# Patient Record
Sex: FEMALE | Race: WHITE | Employment: STUDENT | URBAN - METROPOLITAN AREA
[De-identification: names, ages, dates, MRNs, and addresses within clinical notes are randomized per-mention and may not be internally consistent; named-entity substitution may affect disease eponyms.]

---

## 2018-03-27 ENCOUNTER — APPOINTMENT (OUTPATIENT)
Dept: GENERAL RADIOLOGY | Facility: HOSPITAL | Age: 18
End: 2018-03-27
Payer: COMMERCIAL

## 2018-03-27 ENCOUNTER — HOSPITAL ENCOUNTER (EMERGENCY)
Facility: HOSPITAL | Age: 18
Discharge: HOME | End: 2018-03-28
Attending: PEDIATRICS
Payer: COMMERCIAL

## 2018-03-27 VITALS
DIASTOLIC BLOOD PRESSURE: 74 MMHG | BODY MASS INDEX: 26 KG/M2 | RESPIRATION RATE: 18 BRPM | WEIGHT: 158.75 LBS | SYSTOLIC BLOOD PRESSURE: 133 MMHG | OXYGEN SATURATION: 100 % | HEART RATE: 83 BPM | TEMPERATURE: 98 F

## 2018-03-27 DIAGNOSIS — S93.401A MODERATE RIGHT ANKLE SPRAIN, INITIAL ENCOUNTER: Primary | ICD-10-CM

## 2018-03-27 PROCEDURE — 73610 X-RAY EXAM OF ANKLE: CPT | Performed by: PEDIATRICS

## 2018-03-27 PROCEDURE — 99283 EMERGENCY DEPT VISIT LOW MDM: CPT

## 2018-03-28 NOTE — ED PROVIDER NOTES
Patient Seen in: [facility] Emergency Department    History   Patient presents with:  Lower Extremity Injury (musculoskeletal)    Stated Complaint: right ankle inj    HPI    47-year-old female here with right ankle injury.   She was playing [employer] alert and oriented to person, place, and time. No cranial nerve deficit. She exhibits normal muscle tone. Coordination normal.   Skin: Skin is warm. No rash noted. She is not diaphoretic. No pallor. Psychiatric: She has a normal mood and affect.  Her beha that occurred in the emergency department. Instructed to return to emergency department or contact PCP for persistent, recurrent, or worsening symptoms.       Disposition and Plan     Clinical Impression:  Moderate right ankle sprain, initial encounter  (pr

## 2018-12-04 ENCOUNTER — TELEPHONE (OUTPATIENT)
Dept: INTERNAL MEDICINE CLINIC | Facility: CLINIC | Age: 18
End: 2018-12-04

## 2018-12-07 NOTE — TELEPHONE ENCOUNTER
Has had 2 shots of [address] and 2003 as per records.   So she has had her complete vaccination boosters

## 2019-07-01 ENCOUNTER — OFFICE VISIT (OUTPATIENT)
Dept: OBGYN CLINIC | Facility: CLINIC | Age: 19
End: 2019-07-01
Payer: COMMERCIAL

## 2019-07-01 VITALS
SYSTOLIC BLOOD PRESSURE: 135 MMHG | HEIGHT: 66.7 IN | WEIGHT: 152 LBS | HEART RATE: 98 BPM | DIASTOLIC BLOOD PRESSURE: 82 MMHG | BODY MASS INDEX: 24.14 KG/M2

## 2019-07-01 DIAGNOSIS — Z01.419 ENCOUNTER FOR GYNECOLOGICAL EXAMINATION WITHOUT ABNORMAL FINDING: Primary | ICD-10-CM

## 2019-07-01 PROCEDURE — 99385 PREV VISIT NEW AGE 18-39: CPT | Performed by: OBSTETRICS & GYNECOLOGY

## 2019-07-01 PROCEDURE — 99212 OFFICE O/P EST SF 10 MIN: CPT | Performed by: OBSTETRICS & GYNECOLOGY

## 2019-07-01 RX ORDER — NORETHINDRONE ACETATE AND ETHINYL ESTRADIOL 1.5-30(21)
1 KIT ORAL DAILY
Qty: 3 PACKAGE | Refills: 0 | Status: SHIPPED | OUTPATIENT
Start: 2019-07-01 | End: 2019-09-23

## 2019-07-01 NOTE — PROGRESS NOTES
Na Jacinto is a 22year old female [facility] Patient's last menstrual period was 06/12/2019.   Patient presents with:  Gyn Exam: Larry De Leon / IRREGULAR MENSES    Menses are irreg- menarche was at age 10 and then she did not get another one for almost Relationship status: Not on file      Intimate partner violence:        Fear of current or ex partner: Not on file        Emotionally abused: Not on file        Physically abused: Not on file        Forced sexual activity: Not on file    Other Topics history of anemia, easy bruising or bleeding.       PHYSICAL EXAM:   Constitutional: well developed, well nourished  Head/Face: normocephalic  Neck/Thyroid: thyroid symmetric, no thyromegaly, no nodules, no adenopathy  Lymphatic:no abnormal supraclavicular

## 2019-09-16 ENCOUNTER — TELEPHONE (OUTPATIENT)
Dept: OBGYN CLINIC | Facility: CLINIC | Age: 19
End: 2019-09-16

## 2019-09-16 NOTE — TELEPHONE ENCOUNTER
ADVISED PT SHE WAS SUPPOSED TO RETURN FOR BP CHECK BEFORE GOING BACK TO SCHOOL BUT SHE WAS UNABLE TO DO SO DUE TO TRAVELING. CAN HER  CHECK HER BP AND FAX US THE INFO? WILL YOU ACCEPT THAT IN ORDER TO GET ADDITIONAL REFILLS?

## 2019-09-16 NOTE — TELEPHONE ENCOUNTER
Pt asking if refill of birth control can be sent to pharm closer to school. Updated pharm in chart.  pls adv

## 2019-09-17 NOTE — TELEPHONE ENCOUNTER
Pt. Informed that it is ok for her  to send in her BP reading. Pt. Instructed to have it faxed to [phone] and to call to confirm we received it. If BP is normal , Cap will refill her medication. Pt. Verbalized understanding.

## 2019-09-18 NOTE — TELEPHONE ENCOUNTER
BP, 124/80, received from [facility], [facility] [employer], received and placed on [employer]. OK for refills and how long. See notes from pt's visit on 7-1-19.

## 2019-09-23 ENCOUNTER — TELEPHONE (OUTPATIENT)
Dept: OBGYN CLINIC | Facility: CLINIC | Age: 19
End: 2019-09-23

## 2019-09-23 RX ORDER — NORETHINDRONE ACETATE AND ETHINYL ESTRADIOL 1.5-30(21)
1 KIT ORAL DAILY
Qty: 1 PACKAGE | Refills: 10 | Status: SHIPPED | OUTPATIENT
Start: 2019-09-23 | End: 2020-05-13

## 2020-02-26 ENCOUNTER — TELEPHONE (OUTPATIENT)
Dept: OBGYN CLINIC | Facility: CLINIC | Age: 20
End: 2020-02-26

## 2020-02-26 NOTE — TELEPHONE ENCOUNTER
I CONFIRMED PT WANTS TO  RX AT [facility] IN [place] AND THAT I WILL CALL THEM TO CONFIRM SHE SHOULD STILL HAVE REFILLS AVAILABLE BECAUSE RX WAS SENT ON 9-23-19 FOR 1 PACK WITH 10 REFILLS. I CALLED THE PHARMACY AND IT SHOWS PT HAS BEEN GETTING RX AT A DIFFERENT [facility].   I ASKED HIM TO TRANSFER RX AND GET IT READY AS SHE WILL BE PICKING IT UP TODAY.

## 2020-05-13 RX ORDER — NORETHINDRONE ACETATE AND ETHINYL ESTRADIOL AND FERROUS FUMARATE 1.5-30(21)
KIT ORAL
Qty: 1 PACKAGE | Refills: 1 | Status: SHIPPED | OUTPATIENT
Start: 2020-05-13 | End: 2020-05-18

## 2020-05-18 ENCOUNTER — TELEPHONE (OUTPATIENT)
Dept: OBGYN CLINIC | Facility: CLINIC | Age: 20
End: 2020-05-18

## 2020-05-18 RX ORDER — NORETHINDRONE ACETATE AND ETHINYL ESTRADIOL 1.5-30(21)
1 KIT ORAL DAILY
Qty: 3 PACKAGE | Refills: 1 | Status: SHIPPED | OUTPATIENT
Start: 2020-05-18 | End: 2020-08-17 | Stop reason: ALTCHOICE

## 2020-05-18 NOTE — TELEPHONE ENCOUNTER
Received OCP rx refill request from [employer] rx mail service. Called pt to ask if she is authoring pharmacy change and pt state she is. States she did not  rx from 5/13 because her pharmacy suggested the mail service.  Informed pt nurse can submit refil

## 2020-06-05 RX ORDER — NORETHINDRONE ACETATE AND ETHINYL ESTRADIOL AND FERROUS FUMARATE 1.5-30(21)
KIT ORAL
Qty: 28 TABLET | Refills: 1 | OUTPATIENT
Start: 2020-06-05

## 2020-08-17 ENCOUNTER — OFFICE VISIT (OUTPATIENT)
Dept: INTERNAL MEDICINE CLINIC | Facility: CLINIC | Age: 20
End: 2020-08-17
Payer: COMMERCIAL

## 2020-08-17 ENCOUNTER — TELEPHONE (OUTPATIENT)
Dept: INTERNAL MEDICINE CLINIC | Facility: CLINIC | Age: 20
End: 2020-08-17

## 2020-08-17 ENCOUNTER — OFFICE VISIT (OUTPATIENT)
Dept: OBGYN CLINIC | Facility: CLINIC | Age: 20
End: 2020-08-17
Payer: COMMERCIAL

## 2020-08-17 VITALS
DIASTOLIC BLOOD PRESSURE: 83 MMHG | BODY MASS INDEX: 23.86 KG/M2 | SYSTOLIC BLOOD PRESSURE: 116 MMHG | HEIGHT: 67 IN | HEART RATE: 81 BPM | WEIGHT: 152 LBS

## 2020-08-17 VITALS
TEMPERATURE: 99 F | DIASTOLIC BLOOD PRESSURE: 79 MMHG | SYSTOLIC BLOOD PRESSURE: 125 MMHG | BODY MASS INDEX: 24.65 KG/M2 | HEIGHT: 66 IN | HEART RATE: 64 BPM | WEIGHT: 153.38 LBS

## 2020-08-17 DIAGNOSIS — N89.8 VAGINAL DISCHARGE: ICD-10-CM

## 2020-08-17 DIAGNOSIS — F90.0 ATTENTION DEFICIT HYPERACTIVITY DISORDER (ADHD), PREDOMINANTLY INATTENTIVE TYPE: ICD-10-CM

## 2020-08-17 DIAGNOSIS — Z30.41 ORAL CONTRACEPTIVE PILL SURVEILLANCE: ICD-10-CM

## 2020-08-17 DIAGNOSIS — Z01.419 ENCOUNTER FOR GYNECOLOGICAL EXAMINATION WITHOUT ABNORMAL FINDING: Primary | ICD-10-CM

## 2020-08-17 DIAGNOSIS — Z00.00 ROUTINE PHYSICAL EXAMINATION: Primary | ICD-10-CM

## 2020-08-17 PROCEDURE — 3008F BODY MASS INDEX DOCD: CPT | Performed by: PHYSICIAN ASSISTANT

## 2020-08-17 PROCEDURE — 99385 PREV VISIT NEW AGE 18-39: CPT | Performed by: ADVANCED PRACTICE MIDWIFE

## 2020-08-17 PROCEDURE — 3078F DIAST BP <80 MM HG: CPT | Performed by: PHYSICIAN ASSISTANT

## 2020-08-17 PROCEDURE — 3008F BODY MASS INDEX DOCD: CPT | Performed by: ADVANCED PRACTICE MIDWIFE

## 2020-08-17 PROCEDURE — 3079F DIAST BP 80-89 MM HG: CPT | Performed by: ADVANCED PRACTICE MIDWIFE

## 2020-08-17 PROCEDURE — 3074F SYST BP LT 130 MM HG: CPT | Performed by: PHYSICIAN ASSISTANT

## 2020-08-17 PROCEDURE — 99395 PREV VISIT EST AGE 18-39: CPT | Performed by: PHYSICIAN ASSISTANT

## 2020-08-17 PROCEDURE — 3074F SYST BP LT 130 MM HG: CPT | Performed by: ADVANCED PRACTICE MIDWIFE

## 2020-08-17 RX ORDER — DEXTROAMPHETAMINE SACCHARATE, AMPHETAMINE ASPARTATE, DEXTROAMPHETAMINE SULFATE AND AMPHETAMINE SULFATE 2.5; 2.5; 2.5; 2.5 MG/1; MG/1; MG/1; MG/1
10 TABLET ORAL DAILY
Qty: 30 TABLET | Refills: 0 | Status: SHIPPED | OUTPATIENT
Start: 2020-08-17 | End: 2020-09-25

## 2020-08-17 RX ORDER — KETOCONAZOLE 20 MG/G
CREAM TOPICAL
Qty: 30 G | Refills: 0 | Status: SHIPPED | OUTPATIENT
Start: 2020-08-17 | End: 2020-09-02

## 2020-08-17 RX ORDER — NORGESTIMATE AND ETHINYL ESTRADIOL 7DAYSX3 LO
1 KIT ORAL DAILY
Qty: 3 PACKAGE | Refills: 3 | Status: SHIPPED | OUTPATIENT
Start: 2020-08-17 | End: 2020-09-14 | Stop reason: WASHOUT

## 2020-08-17 RX ORDER — DEXTROAMPHETAMINE SACCHARATE, AMPHETAMINE ASPARTATE, DEXTROAMPHETAMINE SULFATE AND AMPHETAMINE SULFATE 2.5; 2.5; 2.5; 2.5 MG/1; MG/1; MG/1; MG/1
10 TABLET ORAL DAILY
Qty: 30 TABLET | Refills: 0 | Status: SHIPPED | OUTPATIENT
Start: 2020-08-17 | End: 2020-08-17

## 2020-08-17 NOTE — PROGRESS NOTES
HPI:    Patient ID: Ny Quinteros is a 23year old female. HPI    New patient presents for a physical exam. Doing well at this time.  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 Physical Exam    Constitutional: She is oriented to person, place, and time and thin.  She appears well-develop (ADHD), predominantly inattentive type  -start with 5mg daily and go up to 10mg. Follow up in three weeks.  SE discussed    Orders Placed This Encounter      CBC W Differential W Platelet [E]      Comp Metabolic Panel (14) [E]      Lipid Panel [E]      TSH

## 2020-08-17 NOTE — TELEPHONE ENCOUNTER
Patient states medication amphetamine-dextroamphetamine 10 MG Oral Tab is out of stock at her [facility]. Patient is requesting that medication script be resent to [facility] ([address]

## 2020-08-19 LAB — TRICHOMONAS SCREEN: NEGATIVE

## 2020-08-19 NOTE — PROGRESS NOTES
HPI:   Roxanna Sanders is a 23year old female who presents for a gyne annual physical exam. Patient presents with:  Gyn Exam: New pt, annual exam. LPS 7/1/19 negative. Moving to [facility] 9/14/20   Moving to get a masters degree in [facility] [employer] [facility].  Never se breast exam   GI: denies abdominal pain, nausea or vomiting  : denies urinary complaints, vaginal discharge or itching, dyspareunia, reports periods regular - some breakthrough bleeding  MUSCULOSKELETAL: denies back pain  PSYCHE: denies depression or anx JOHNNY  8/19/2020  1:43 PM

## 2020-08-21 ENCOUNTER — LAB ENCOUNTER (OUTPATIENT)
Dept: LAB | Age: 20
End: 2020-08-21
Attending: PHYSICIAN ASSISTANT
Payer: COMMERCIAL

## 2020-08-21 DIAGNOSIS — Z00.00 ROUTINE PHYSICAL EXAMINATION: ICD-10-CM

## 2020-08-21 LAB
ALBUMIN SERPL-MCNC: 3.9 G/DL (ref 3.4–5)
ALBUMIN/GLOB SERPL: 1.1 {RATIO} (ref 1–2)
ALP LIVER SERPL-CCNC: 43 U/L (ref 52–144)
ALT SERPL-CCNC: 19 U/L (ref 13–56)
ANION GAP SERPL CALC-SCNC: 6 MMOL/L (ref 0–18)
AST SERPL-CCNC: 14 U/L (ref 15–37)
BASOPHILS # BLD AUTO: 0.05 X10(3) UL (ref 0–0.2)
BASOPHILS NFR BLD AUTO: 1 %
BILIRUB SERPL-MCNC: 0.9 MG/DL (ref 0.1–2)
BILIRUB UR QL: NEGATIVE
BUN BLD-MCNC: 9 MG/DL (ref 7–18)
BUN/CREAT SERPL: 9.3 (ref 10–20)
CALCIUM BLD-MCNC: 9.6 MG/DL (ref 8.5–10.1)
CHLORIDE SERPL-SCNC: 107 MMOL/L (ref 98–112)
CHOLEST SMN-MCNC: 186 MG/DL (ref ?–200)
CLARITY UR: CLEAR
CO2 SERPL-SCNC: 27 MMOL/L (ref 21–32)
COLOR UR: YELLOW
CREAT BLD-MCNC: 0.97 MG/DL (ref 0.55–1.02)
DEPRECATED RDW RBC AUTO: 36.4 FL (ref 35.1–46.3)
EOSINOPHIL # BLD AUTO: 0.21 X10(3) UL (ref 0–0.7)
EOSINOPHIL NFR BLD AUTO: 4.3 %
ERYTHROCYTE [DISTWIDTH] IN BLOOD BY AUTOMATED COUNT: 11.2 % (ref 11–15)
GLOBULIN PLAS-MCNC: 3.7 G/DL (ref 2.8–4.4)
GLUCOSE BLD-MCNC: 76 MG/DL (ref 70–99)
GLUCOSE UR-MCNC: NEGATIVE MG/DL
HCT VFR BLD AUTO: 42.2 % (ref 35–48)
HDLC SERPL-MCNC: 60 MG/DL (ref 40–59)
HGB BLD-MCNC: 14.6 G/DL (ref 12–16)
HGB UR QL STRIP.AUTO: NEGATIVE
IMM GRANULOCYTES # BLD AUTO: 0.01 X10(3) UL (ref 0–1)
IMM GRANULOCYTES NFR BLD: 0.2 %
KETONES UR-MCNC: NEGATIVE MG/DL
LDLC SERPL CALC-MCNC: 112 MG/DL (ref ?–100)
LYMPHOCYTES # BLD AUTO: 2.19 X10(3) UL (ref 1–4)
LYMPHOCYTES NFR BLD AUTO: 44.9 %
M PROTEIN MFR SERPL ELPH: 7.6 G/DL (ref 6.4–8.2)
MCH RBC QN AUTO: 31.2 PG (ref 26–34)
MCHC RBC AUTO-ENTMCNC: 34.6 G/DL (ref 31–37)
MCV RBC AUTO: 90.2 FL (ref 80–100)
MONOCYTES # BLD AUTO: 0.46 X10(3) UL (ref 0.1–1)
MONOCYTES NFR BLD AUTO: 9.4 %
NEUTROPHILS # BLD AUTO: 1.96 X10 (3) UL (ref 1.5–7.7)
NEUTROPHILS # BLD AUTO: 1.96 X10(3) UL (ref 1.5–7.7)
NEUTROPHILS NFR BLD AUTO: 40.2 %
NITRITE UR QL STRIP.AUTO: NEGATIVE
NONHDLC SERPL-MCNC: 126 MG/DL (ref ?–130)
OSMOLALITY SERPL CALC.SUM OF ELEC: 287 MOSM/KG (ref 275–295)
PATIENT FASTING Y/N/NP: YES
PATIENT FASTING Y/N/NP: YES
PH UR: 7 [PH] (ref 5–8)
PLATELET # BLD AUTO: 244 10(3)UL (ref 150–450)
POTASSIUM SERPL-SCNC: 4.5 MMOL/L (ref 3.5–5.1)
PROT UR-MCNC: NEGATIVE MG/DL
RBC # BLD AUTO: 4.68 X10(6)UL (ref 3.8–5.3)
RBC #/AREA URNS AUTO: 2 /HPF
SODIUM SERPL-SCNC: 140 MMOL/L (ref 136–145)
SP GR UR STRIP: 1.01 (ref 1–1.03)
T4 FREE SERPL-MCNC: 1.1 NG/DL (ref 0.8–1.7)
TRIGL SERPL-MCNC: 70 MG/DL (ref 30–149)
TSI SER-ACNC: 5.08 MIU/ML (ref 0.36–3.74)
UROBILINOGEN UR STRIP-ACNC: <2
VLDLC SERPL CALC-MCNC: 14 MG/DL (ref 0–30)
WBC # BLD AUTO: 4.9 X10(3) UL (ref 4–11)
WBC #/AREA URNS AUTO: 18 /HPF

## 2020-08-21 PROCEDURE — 84439 ASSAY OF FREE THYROXINE: CPT

## 2020-08-21 PROCEDURE — 82306 VITAMIN D 25 HYDROXY: CPT

## 2020-08-21 PROCEDURE — 84443 ASSAY THYROID STIM HORMONE: CPT

## 2020-08-21 PROCEDURE — 81001 URINALYSIS AUTO W/SCOPE: CPT

## 2020-08-21 PROCEDURE — 80061 LIPID PANEL: CPT

## 2020-08-21 PROCEDURE — 80053 COMPREHEN METABOLIC PANEL: CPT

## 2020-08-21 PROCEDURE — 36415 COLL VENOUS BLD VENIPUNCTURE: CPT

## 2020-08-21 PROCEDURE — 85025 COMPLETE CBC W/AUTO DIFF WBC: CPT

## 2020-08-22 DIAGNOSIS — E03.8 SUBCLINICAL HYPOTHYROIDISM: Primary | ICD-10-CM

## 2020-08-24 LAB — 25(OH)D3 SERPL-MCNC: 63.3 NG/ML (ref 30–100)

## 2020-09-03 RX ORDER — KETOCONAZOLE 20 MG/G
CREAM TOPICAL
Qty: 30 G | Refills: 0 | Status: SHIPPED | OUTPATIENT
Start: 2020-09-03

## 2020-09-26 ENCOUNTER — TELEPHONE (OUTPATIENT)
Dept: OBGYN CLINIC | Facility: CLINIC | Age: 20
End: 2020-09-26

## 2020-09-26 ENCOUNTER — TELEPHONE (OUTPATIENT)
Dept: INTERNAL MEDICINE CLINIC | Facility: CLINIC | Age: 20
End: 2020-09-26

## 2020-09-26 RX ORDER — NORGESTIMATE AND ETHINYL ESTRADIOL 7DAYSX3 LO
1 KIT ORAL DAILY
Qty: 3 PACKAGE | Refills: 3 | Status: SHIPPED | OUTPATIENT
Start: 2020-09-26 | End: 2020-10-24 | Stop reason: WASHOUT

## 2020-09-26 RX ORDER — DEXTROAMPHETAMINE SACCHARATE, AMPHETAMINE ASPARTATE, DEXTROAMPHETAMINE SULFATE AND AMPHETAMINE SULFATE 2.5; 2.5; 2.5; 2.5 MG/1; MG/1; MG/1; MG/1
10 TABLET ORAL DAILY
Qty: 30 TABLET | Refills: 0 | Status: SHIPPED | OUTPATIENT
Start: 2020-09-26 | End: 2020-09-29

## 2020-09-26 NOTE — TELEPHONE ENCOUNTER
Verified pt name and . Reviewed doctor's instructions as noted below. Doximity appt scheduled 2020. Advised pt insurance co-pay may apply and to answer phone if call from unknown/private number.

## 2020-09-26 NOTE — TELEPHONE ENCOUNTER
Attempted to call pharmacy but was on hold for an extended time. rx sent in electronically. Pt notified. Will f/u w/ pharmacy.  Pt verbalized an understanding & agrees w/ plan

## 2020-09-26 NOTE — TELEPHONE ENCOUNTER
Patient called and is asking a three month refill on birthcontrol Is moving overseas soon. Would like it called into the [facility] in [place] at [phone] if possible.      Please contact

## 2020-09-26 NOTE — TELEPHONE ENCOUNTER
Patient states her medication currently back order for at least 2 weeks. Pharmacy only has 20MG available. Pharmacy suggested for PCP may be able to up her dosage. Patient requesting to up her dosage and she will split in half. Please advise.      dina

## 2020-09-26 NOTE — TELEPHONE ENCOUNTER
I last saw this patient in 2016. She was seen by Martine Cormier and was started on medication and advised to follow-up in 3 weeks. Any change in medication or continuation of medication will need formal evaluation.   Please set up an appointment–video visit/jose

## 2020-09-28 NOTE — PROGRESS NOTES
HPI:    Patient ID: Timmy Yoon is a 23year old female.   [facility] [address] Verbal Consent   I conducted a telehealth visit with Timmy Yoon today, 09/28/20, which was completed using two-way, real-time interactive aud .was not able to maintain grades as focus started to be an issue). Associated symptoms include fatigue. Pertinent negatives include no weakness. The symptoms are aggravated by stress.  She has tried rest (adderall-low doses -seemed to help) for the symptoms time.   Skin: Skin is warm and dry. No rash noted. Psychiatric: She has a normal mood and affect. Her behavior is normal. Judgment and thought content normal.   Vitals reviewed.              ASSESSMENT/PLAN:     Problem List Items Addressed This Visit

## 2020-09-29 ENCOUNTER — TELEPHONE (OUTPATIENT)
Dept: INTERNAL MEDICINE CLINIC | Facility: CLINIC | Age: 20
End: 2020-09-29

## 2020-09-29 PROBLEM — F98.8 ATTENTION DEFICIT DISORDER (ADD) WITHOUT HYPERACTIVITY: Status: ACTIVE | Noted: 2020-09-29

## 2020-09-29 NOTE — TELEPHONE ENCOUNTER
Patient reports phone visit yesterday with Dr Tiera Welch. Reports was told 1 script for Adderall would be sent to her pharmacy to take with her since she will be going overseas, she will follow up when she gets back.  Patient picked up last script of Adderall 9

## 2020-09-30 NOTE — ASSESSMENT & PLAN NOTE
Recent diagnosis of add.   Recommend formal eval for further management  Pt planning to move abroad for school and would like meds and does not have time for a formal eval.  rx for adderall 20 mgs being sent in at this time

## 2020-10-26 RX ORDER — DEXTROAMPHETAMINE SACCHARATE, AMPHETAMINE ASPARTATE, DEXTROAMPHETAMINE SULFATE AND AMPHETAMINE SULFATE 5; 5; 5; 5 MG/1; MG/1; MG/1; MG/1
TABLET ORAL
Qty: 30 TABLET | Refills: 0 | Status: SHIPPED | OUTPATIENT
Start: 2020-10-26 | End: 2021-10-08

## 2020-12-30 ENCOUNTER — NURSE TRIAGE (OUTPATIENT)
Dept: INTERNAL MEDICINE CLINIC | Facility: CLINIC | Age: 20
End: 2020-12-30

## 2020-12-30 RX ORDER — AMOXICILLIN AND CLAVULANATE POTASSIUM 875; 125 MG/1; MG/1
1 TABLET, FILM COATED ORAL 2 TIMES DAILY
Qty: 20 TABLET | Refills: 0 | Status: SHIPPED | OUTPATIENT
Start: 2020-12-30 | End: 2021-01-09

## 2020-12-30 NOTE — TELEPHONE ENCOUNTER
Action Requested: Summary for Provider     []  Critical Lab, Recommendations Needed  [] Need Additional Advice  []   FYI    []   Need Orders  [] Need Medications Sent to Pharmacy  []  Other     SUMMARY:Pt requesting appt, s/s right index finger nail bed

## 2021-03-30 ENCOUNTER — TELEPHONE (OUTPATIENT)
Dept: INTERNAL MEDICINE CLINIC | Facility: CLINIC | Age: 21
End: 2021-03-30

## 2021-03-30 DIAGNOSIS — Z20.822 CLOSE EXPOSURE TO COVID-19 VIRUS: Primary | ICD-10-CM

## 2021-03-30 DIAGNOSIS — Z20.828 EXPOSURE TO SARS-ASSOCIATED CORONAVIRUS: Primary | ICD-10-CM

## 2021-03-30 NOTE — TELEPHONE ENCOUNTER
Patient calling to f/u; informed order was placed and Central Scheduling phone # provided to schedule appt for lab. Advised should wait at least 5-7 days from date of last exposure to complete lab and pt voiced understanding.

## 2021-03-30 NOTE — TELEPHONE ENCOUNTER
Patient calling reports  has been exposed to Rober  On Friday  ( her mom )   Saturday began to have a dry cough, headache, sore throat, runny nose ,     Has been isolating since Saturday    Asking to be COVID  Tested     Lab order  pended for your review,

## 2021-03-30 NOTE — TELEPHONE ENCOUNTER
SARS-COV-2 BY PCR Sam Fuentes) [[id number]]    Electronically signed by: Janki Shaw MD on 03/30/21 8212 Status: Active   Ordering user: Janki Shaw MD 03/30/21 1938 Authorized by: Janki Shaw MD   Frequency:  03/30/21 -     Diagnoses   Close exposure

## 2021-03-31 ENCOUNTER — LAB ENCOUNTER (OUTPATIENT)
Dept: LAB | Facility: HOSPITAL | Age: 21
End: 2021-03-31
Attending: INTERNAL MEDICINE
Payer: COMMERCIAL

## 2021-03-31 DIAGNOSIS — Z20.822 CLOSE EXPOSURE TO COVID-19 VIRUS: ICD-10-CM

## 2021-05-29 ENCOUNTER — TELEPHONE (OUTPATIENT)
Dept: INTERNAL MEDICINE CLINIC | Facility: CLINIC | Age: 21
End: 2021-05-29

## 2021-05-29 NOTE — TELEPHONE ENCOUNTER
Carlos Herrera I received a call from pt  Nayeli on [employer].  Mother will like a letter that states pt has tested positive for covid on 3/31/2021 so she can travel with out having to do a covid test. Per mother  CDC says  if there is a letter from the physician

## 2021-06-02 NOTE — TELEPHONE ENCOUNTER
Okay to send her a note stating that she was positive for Covid on March 31, 2021-we need to have documentation of the positive test.  She may still need to be screened for Covid prior to travel.

## 2021-06-02 NOTE — TELEPHONE ENCOUNTER
Called and spoke with mother Everardo Granados, advised that requested note now available in patient's MyChart. Mother states patient is home already, was able to travel without testing after showing positive Covid test from chart.   No further needs

## 2021-10-08 NOTE — PROGRESS NOTES
HPI:    Patient ID: Susi Richard is a 19year old female.   Telehealth outside of [facility] [address] Verbal Consent   I conducted a telehealth visit with Susi Richard today, 10/08/21, which was completed using two-way, real-time interactive aud problem. The current episode started more than 1 year ago. The problem occurs intermittently.  The problem has been waxing and waning (has not been officially evaluated and was starting to show difficulties with concentration and focus when trying to work a Content:  Thought content normal.         Judgment: Judgment normal.                ASSESSMENT/PLAN:     Problem List Items Addressed This Visit        Unprioritized    Routine physical examination    Relevant Orders    CBC WITH DIFFERENTIAL WITH PLATELET

## 2021-10-08 NOTE — ASSESSMENT & PLAN NOTE
History of difficulty with attention, focus. Increasing difficulty with multitasking. 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 Has had difficulty with completing homework on time.   She did have a small prescription for this when she was abroad and doing a

## 2021-10-08 NOTE — PATIENT INSTRUCTIONS
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 Increasing difficulty with multitasking.   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

## 2021-10-14 RX ORDER — NORGESTIMATE AND ETHINYL ESTRADIOL
KIT
Qty: 84 TABLET | Refills: 0 | Status: SHIPPED | OUTPATIENT
Start: 2021-10-14 | End: 2021-12-20

## 2021-10-14 NOTE — TELEPHONE ENCOUNTER
mychart message sent to patient from [facility] regarding refill. Received in Allergy in error.

## 2021-10-14 NOTE — TELEPHONE ENCOUNTER
Spoke with pt and advised she is due for annual exam. Pt states she is currently out of state for school and will be returning in December. Appt for annual scheduled in December and refill sent to pharmacy. Pt agreed and voiced understanding.

## 2021-11-29 ENCOUNTER — LAB ENCOUNTER (OUTPATIENT)
Dept: LAB | Age: 21
End: 2021-11-29
Attending: INTERNAL MEDICINE
Payer: COMMERCIAL

## 2021-11-29 DIAGNOSIS — Z00.00 ROUTINE PHYSICAL EXAMINATION: ICD-10-CM

## 2021-11-29 PROCEDURE — 80061 LIPID PANEL: CPT

## 2021-11-29 PROCEDURE — 81001 URINALYSIS AUTO W/SCOPE: CPT

## 2021-11-29 PROCEDURE — 85025 COMPLETE CBC W/AUTO DIFF WBC: CPT

## 2021-11-29 PROCEDURE — 84443 ASSAY THYROID STIM HORMONE: CPT

## 2021-11-29 PROCEDURE — 80053 COMPREHEN METABOLIC PANEL: CPT

## 2021-11-29 PROCEDURE — 36415 COLL VENOUS BLD VENIPUNCTURE: CPT

## 2021-12-08 NOTE — TELEPHONE ENCOUNTER
DR Martinez=see message below and advise, do you want us to use Savi Spicer navigator ? .or neuropsychology ? both referral pended . Thanks. From: Idania Osorio  To:  Clarissa Webb MD  Sent: 12/7/2021  7:57 PM CST  Subject: Psych Eval for Adderall

## 2021-12-20 ENCOUNTER — OFFICE VISIT (OUTPATIENT)
Dept: OBGYN CLINIC | Facility: CLINIC | Age: 21
End: 2021-12-20
Payer: COMMERCIAL

## 2021-12-20 VITALS
BODY MASS INDEX: 26 KG/M2 | WEIGHT: 166 LBS | DIASTOLIC BLOOD PRESSURE: 83 MMHG | SYSTOLIC BLOOD PRESSURE: 146 MMHG | HEART RATE: 97 BPM

## 2021-12-20 DIAGNOSIS — Z30.09 BIRTH CONTROL COUNSELING: ICD-10-CM

## 2021-12-20 DIAGNOSIS — Z30.41 ENCOUNTER FOR SURVEILLANCE OF CONTRACEPTIVE PILLS: Primary | ICD-10-CM

## 2021-12-20 DIAGNOSIS — Z11.3 SCREEN FOR STD (SEXUALLY TRANSMITTED DISEASE): ICD-10-CM

## 2021-12-20 PROCEDURE — 81025 URINE PREGNANCY TEST: CPT | Performed by: ADVANCED PRACTICE MIDWIFE

## 2021-12-20 PROCEDURE — 99212 OFFICE O/P EST SF 10 MIN: CPT | Performed by: ADVANCED PRACTICE MIDWIFE

## 2021-12-20 PROCEDURE — 3077F SYST BP >= 140 MM HG: CPT | Performed by: ADVANCED PRACTICE MIDWIFE

## 2021-12-20 PROCEDURE — 3079F DIAST BP 80-89 MM HG: CPT | Performed by: ADVANCED PRACTICE MIDWIFE

## 2021-12-20 RX ORDER — NORGESTIMATE AND ETHINYL ESTRADIOL 7DAYSX3 LO
1 KIT ORAL DAILY
Qty: 84 TABLET | Refills: 4 | Status: SHIPPED | OUTPATIENT
Start: 2021-12-20

## 2021-12-20 NOTE — PROGRESS NOTES
HPI:   Jasper Mccollum is a 19year old female who presents for birth control refill. No complaints - recently started medications for thyroid. Now living and working in [place] [place]. Happy.  Not sexually active    Wt Readings from Last complaints, vaginal discharge or itching, dyspareunia, reports periods regular   MUSCULOSKELETAL: denies back pain  PSYCHE: denies depression or anxiety, denies exposure to violence or abuse.   ENDOCRINE: denies cold intolerance, weight gain, hair loss, pal

## 2022-01-17 ENCOUNTER — MED REC SCAN ONLY (OUTPATIENT)
Dept: INTERNAL MEDICINE CLINIC | Facility: CLINIC | Age: 22
End: 2022-01-17

## 2022-01-24 ENCOUNTER — TELEPHONE (OUTPATIENT)
Dept: INTERNAL MEDICINE CLINIC | Facility: CLINIC | Age: 22
End: 2022-01-24

## 2022-01-24 NOTE — TELEPHONE ENCOUNTER
Patient is seeing provider out of state for mental health. Provider is requesting information in regards to ADD diagnosis and adderall prescription. Patient does not have a name of provider at this time. No appointments are scheduled at this time.      bookjam

## 2022-01-25 NOTE — TELEPHONE ENCOUNTER
Called pt. And she gave me [address] [facility] [address] to send notes.

## 2022-01-25 NOTE — TELEPHONE ENCOUNTER
Please send last office visit note to the requesting physician or to patient herself.   We will need return release of records form signed

## 2022-06-19 ENCOUNTER — PATIENT MESSAGE (OUTPATIENT)
Dept: INTERNAL MEDICINE CLINIC | Facility: CLINIC | Age: 22
End: 2022-06-19

## 2022-06-20 RX ORDER — NORGESTIMATE AND ETHINYL ESTRADIOL
KIT
Qty: 84 TABLET | Refills: 3 | Status: SHIPPED | OUTPATIENT
Start: 2022-06-20

## 2022-06-20 NOTE — TELEPHONE ENCOUNTER
From: Kaitlyn Guillaume  To: Chantel Blackburn PA-C  Sent: 2022 12:05 PM CDT  Subject: Updated Vaccinations    Hello,   I am just reaching out about getting an update on my vaccinations as my brother and sister-in-law will be having a baby and I want to make sure I have everything updated that needs to be. Specifically TDAP and any other vaccinations that are usually recommended before being around a . I am Covid-19 vaccinated and Boosted, so if that is something that can be updated in my records that would be awesome. If there is anything else I need to do please let me know thank you.    [employer]

## 2023-01-20 ENCOUNTER — PATIENT MESSAGE (OUTPATIENT)
Dept: OBGYN CLINIC | Facility: CLINIC | Age: 23
End: 2023-01-20

## 2023-08-14 ENCOUNTER — LAB ENCOUNTER (OUTPATIENT)
Dept: LAB | Facility: HOSPITAL | Age: 23
End: 2023-08-14
Attending: ADVANCED PRACTICE MIDWIFE
Payer: COMMERCIAL

## 2023-08-14 ENCOUNTER — OFFICE VISIT (OUTPATIENT)
Dept: OBGYN CLINIC | Facility: CLINIC | Age: 23
End: 2023-08-14

## 2023-08-14 VITALS
HEART RATE: 65 BPM | DIASTOLIC BLOOD PRESSURE: 86 MMHG | SYSTOLIC BLOOD PRESSURE: 133 MMHG | HEIGHT: 67 IN | WEIGHT: 144 LBS | BODY MASS INDEX: 22.6 KG/M2

## 2023-08-14 DIAGNOSIS — Z23 NEED FOR HPV VACCINATION: ICD-10-CM

## 2023-08-14 DIAGNOSIS — Z01.419 ENCOUNTER FOR WELL WOMAN EXAM WITH ROUTINE GYNECOLOGICAL EXAM: Primary | ICD-10-CM

## 2023-08-14 DIAGNOSIS — N92.6 IRREGULAR MENSES: ICD-10-CM

## 2023-08-14 DIAGNOSIS — N90.89 VULVAR LESION: ICD-10-CM

## 2023-08-14 LAB
T4 FREE SERPL-MCNC: 1 NG/DL (ref 0.8–1.7)
TSI SER-ACNC: 3.39 MIU/ML (ref 0.36–3.74)

## 2023-08-14 PROCEDURE — 3075F SYST BP GE 130 - 139MM HG: CPT | Performed by: ADVANCED PRACTICE MIDWIFE

## 2023-08-14 PROCEDURE — 99395 PREV VISIT EST AGE 18-39: CPT | Performed by: ADVANCED PRACTICE MIDWIFE

## 2023-08-14 PROCEDURE — 90651 9VHPV VACCINE 2/3 DOSE IM: CPT | Performed by: ADVANCED PRACTICE MIDWIFE

## 2023-08-14 PROCEDURE — 90471 IMMUNIZATION ADMIN: CPT | Performed by: ADVANCED PRACTICE MIDWIFE

## 2023-08-14 PROCEDURE — 84443 ASSAY THYROID STIM HORMONE: CPT | Performed by: ADVANCED PRACTICE MIDWIFE

## 2023-08-14 PROCEDURE — 84439 ASSAY OF FREE THYROXINE: CPT | Performed by: ADVANCED PRACTICE MIDWIFE

## 2023-08-14 PROCEDURE — 3008F BODY MASS INDEX DOCD: CPT | Performed by: ADVANCED PRACTICE MIDWIFE

## 2023-08-14 PROCEDURE — 3079F DIAST BP 80-89 MM HG: CPT | Performed by: ADVANCED PRACTICE MIDWIFE

## 2023-08-16 LAB
GENITAL VAGINOSIS SCREEN: NEGATIVE
TRICHOMONAS SCREEN: NEGATIVE

## 2023-08-17 ENCOUNTER — TELEPHONE (OUTPATIENT)
Dept: OBGYN CLINIC | Facility: CLINIC | Age: 23
End: 2023-08-17

## 2023-08-17 PROBLEM — N92.6 IRREGULAR MENSES: Status: ACTIVE | Noted: 2023-08-17

## 2023-08-17 PROBLEM — N90.89 VULVAR LESION: Status: ACTIVE | Noted: 2023-08-17

## 2023-08-17 NOTE — TELEPHONE ENCOUNTER
Please call this patient since all her cultures came back negative I would like to see her again to evaluate the perineal tissues  She can make an appointment to see me If she wants to discuss further I will be on call Monday and she can call the office and have me paged

## 2023-08-17 NOTE — TELEPHONE ENCOUNTER
Name and  verified    Patient given MS results and recommendations. Patient going to call phone room to schedule after looking at work schedule. Patient given number.

## 2023-08-17 NOTE — TELEPHONE ENCOUNTER
Name and  verified. Patient given MS results and recommendations. Patient verbalized understanding.      \"  Please call this patient since all her cultures came back negative I would like to see her again to evaluate the perineal tissues  She can make an appointment to see me If she wants to discuss further I will be on call Monday and she can call the office and have me paged     All cultures negative, thyroid wdl

## 2023-09-01 LAB — HPV I/H RISK 1 DNA SPEC QL NAA+PROBE: NEGATIVE

## 2023-10-16 ENCOUNTER — TELEPHONE (OUTPATIENT)
Dept: OBGYN CLINIC | Facility: CLINIC | Age: 23
End: 2023-10-16

## 2023-10-20 ENCOUNTER — NURSE ONLY (OUTPATIENT)
Dept: OBGYN CLINIC | Facility: CLINIC | Age: 23
End: 2023-10-20
Payer: COMMERCIAL

## 2023-10-20 DIAGNOSIS — Z23 NEED FOR VACCINATION: Primary | ICD-10-CM

## 2023-10-20 PROCEDURE — 90651 9VHPV VACCINE 2/3 DOSE IM: CPT | Performed by: ADVANCED PRACTICE MIDWIFE

## 2023-10-20 PROCEDURE — 90471 IMMUNIZATION ADMIN: CPT | Performed by: ADVANCED PRACTICE MIDWIFE

## 2024-02-23 ENCOUNTER — TELEPHONE (OUTPATIENT)
Dept: OBGYN CLINIC | Facility: CLINIC | Age: 24
End: 2024-02-23

## 2024-02-26 ENCOUNTER — NURSE ONLY (OUTPATIENT)
Dept: OBGYN CLINIC | Facility: CLINIC | Age: 24
End: 2024-02-26
Payer: COMMERCIAL

## 2024-02-26 VITALS — HEART RATE: 73 BPM | DIASTOLIC BLOOD PRESSURE: 78 MMHG | SYSTOLIC BLOOD PRESSURE: 137 MMHG

## 2024-02-26 PROCEDURE — 90471 IMMUNIZATION ADMIN: CPT | Performed by: ADVANCED PRACTICE MIDWIFE

## 2024-02-26 PROCEDURE — 90651 9VHPV VACCINE 2/3 DOSE IM: CPT | Performed by: ADVANCED PRACTICE MIDWIFE

## 2024-02-26 NOTE — PROGRESS NOTES
Cinthia presents for the 3rd HPV injection. Consent was signed and sent to scanning. Injection was given in L deltoid and patient tolerated well. Documented injection in MAR.

## 2024-11-20 NOTE — PROGRESS NOTES
Patient here today for nurse visit for 2nd HPV vaccine. Verifed name and . Administered injection on left deltoid, tolerated well, no adverse reactions. Patient waited 10-15 mins after injection in exam room. 2 central/ICU

## 2025-01-07 ENCOUNTER — OFFICE VISIT (OUTPATIENT)
Dept: FAMILY MEDICINE CLINIC | Facility: CLINIC | Age: 25
End: 2025-01-07
Payer: COMMERCIAL

## 2025-01-07 VITALS
TEMPERATURE: 99 F | DIASTOLIC BLOOD PRESSURE: 78 MMHG | RESPIRATION RATE: 18 BRPM | HEIGHT: 67 IN | BODY MASS INDEX: 25.53 KG/M2 | WEIGHT: 162.63 LBS | HEART RATE: 94 BPM | SYSTOLIC BLOOD PRESSURE: 122 MMHG

## 2025-01-07 DIAGNOSIS — J00 ACUTE RHINITIS: ICD-10-CM

## 2025-01-07 DIAGNOSIS — J34.89 SINUS PRESSURE: Primary | ICD-10-CM

## 2025-01-07 LAB
OPERATOR ID: NORMAL
RAPID SARS-COV-2 BY PCR: NOT DETECTED

## 2025-01-07 PROCEDURE — 99213 OFFICE O/P EST LOW 20 MIN: CPT | Performed by: NURSE PRACTITIONER

## 2025-01-07 PROCEDURE — U0002 COVID-19 LAB TEST NON-CDC: HCPCS | Performed by: NURSE PRACTITIONER

## 2025-01-07 RX ORDER — DEXTROAMPHETAMINE SACCHARATE, AMPHETAMINE ASPARTATE MONOHYDRATE, DEXTROAMPHETAMINE SULFATE AND AMPHETAMINE SULFATE 7.5; 7.5; 7.5; 7.5 MG/1; MG/1; MG/1; MG/1
30 CAPSULE, EXTENDED RELEASE ORAL DAILY
COMMUNITY
Start: 2024-12-26 | End: 2025-01-25

## 2025-01-07 NOTE — PROGRESS NOTES
CHIEF COMPLAINT:     Chief Complaint   Patient presents with    Sinus Problem     Day w/ sinus headache, runny nose, and dry throat.        HPI:   Cinthia García is a 24 year old female presents to clinic with complaint of sinus symptoms starting yesterday.  Pt reports scratchy/dry throat, congestion, facial pressure, ear fullness, nasal drainage, couldn't sleep well due to having to blow nose, HA.  Denies fever, cough, dyspnea, wheezing, SOB, chest pain, vision change, GI complaints, ear pain, or rashes.  Took ibuprofen 400 mg this morning with some relief of HA.  No known ill contacts.  Denies diagnosed migraine Hx but has been having increased headaches this past year.  Also hx of sinusitis and sinus surgery.    Current Outpatient Medications   Medication Sig Dispense Refill    amphetamine-dextroamphetamine ER 30 MG Oral Capsule SR 24 Hr Take 1 capsule (30 mg total) by mouth daily.        Past Medical History:    Acne    Decorative tattoo    Routine physical examination    Sprain of ankle, unspecified site    Log Date: 10/16/2012         Social History:  Social History     Socioeconomic History    Marital status: Single   Tobacco Use    Smoking status: Never    Smokeless tobacco: Never   Vaping Use    Vaping status: Never Used   Substance and Sexual Activity    Alcohol use: Yes     Alcohol/week: 0.0 standard drinks of alcohol    Drug use: No    Sexual activity: Never     Partners: Male   Other Topics Concern    Caffeine Concern No     Social Drivers of Health      [facility] from [facility]    Social Connections        REVIEW OF SYSTEMS:   GENERAL HEALTH: See HPI  SKIN: denies any unusual skin lesions or rashes  HEENT: denies ear pain or difficulty swallowing/eating. See HPI  RESPIRATORY: denies shortness of breath or wheezing  CARDIOVASCULAR: denies chest pain or palpitations   GI: denies vomiting or diarrhea  NEURO: denies dizziness or  lightheadedness    EXAM:   /78   Pulse 94   Temp 98.5 °F (36.9 °C)   Resp 18   Ht 5' 7\" (1.702 m)   Wt 162 lb 9.6 oz (73.8 kg)   LMP 01/01/2025 (Approximate)   BMI 25.47 kg/m²   GENERAL: Well-appearing, well developed, well nourished, in no apparent distress  SKIN: no rashes, no suspicious lesions  HEAD: atraumatic, normocephalic  EYES: conjunctiva clear, EOM intact, PERRL  EARS: TM's clear, non-injected, no bulging, retraction, or fluid bilaterally  NOSE: nostrils patent, no exudates, nasal mucosa pink and +inflamed  THROAT: oral mucosa pink, moist. Posterior pharynx non-erythematous with +PND. No exudates.   NECK: supple, non-tender  LUNGS: clear to auscultation bilaterally, no wheezes or rhonchi. Breathing is non labored. No cough  CARDIO: RRR without murmur  LYMPH: No lymphadenopathy.  NEURO: A&O x3.  Cranial nerves grossly intact.  Gait- normal.  Moving all extremities without difficulty.  No weakness, no facial droop, no slurred speech.     No results found for this or any previous visit (from the past 24 hours).      ASSESSMENT AND PLAN:   Assessment:   Cinthia was seen today for sinus problem.    Diagnoses and all orders for this visit:    Sinus pressure  -     COVID-19 LAB TEST NON-CDC          Plan:   - Neg rapid covid  - Hx/exam c/w viral cause at this time  - Advised OTC/comfort measures for now which were explained and discussed as listed in Patient Instructions.  - Advised follow up in 7-10 days if not improving, condition worsens, or new/persistent fevers.  - Advised to strictly proceed to ER if ever severe/worst headache of life, visual changes, associated dizziness, weakness, disorientation, slurred speech.  - Advised to also schedule follow up with PCP regarding the increased frequency of headaches.  - Pt verbalizes understanding and is agreeable w/ plan.    There are no Patient Instructions on file for this visit.

## (undated) NOTE — LETTER
2021              [address] [facility] [address]         To Whom It May Concern,    Bryon Madison DEBORAH 3/18/2000 had tested positive for covid on 3/31/2021. Patient had a viral PCR  covid test done.  Laney

## (undated) NOTE — LETTER
VACCINE ADMINISTRATION RECORD  PARENT / GUARDIAN APPROVAL  Date: 2024  Vaccine administered to: Cinthia García     : 3/18/2000    MRN: [id number]    A copy of the appropriate Centers for Disease Control and Prevention Vaccine Information statement has been provided. I have read or have had explained the information about the diseases and the vaccines listed below. There was an opportunity to ask questions and any questions were answered satisfactorily. I believe that I understand the benefits and risks of the vaccine cited and ask that the vaccine(s) listed below be given to me or to the person named above (for whom I am authorized to make this request).    VACCINES ADMINISTERED:  Gardasil    I have read and hereby agree to be bound by the terms of this agreement as stated above. My signature is valid until revoked by me in writing.  This document is signed by Cinthia Ricky, relationship: Self on 2024.:                                                                                                                                         Parent / Guardian Signature                                                Date    Miley STAFFORD RN served as a witness to authentication that the identity of the person signing electronically is in fact the person represented as signing.

## (undated) NOTE — LETTER
VACCINE ADMINISTRATION RECORD  PARENT / GUARDIAN APPROVAL  Date: 10/20/2023  Vaccine administered to: Case Pate     : 3/18/2000    MRN: [id number]    A copy of the appropriate Centers for Disease Control and Prevention Vaccine Information statement has been provided. I have read or have had explained the information about the diseases and the vaccines listed below. There was an opportunity to ask questions and any questions were answered satisfactorily. I believe that I understand the benefits and risks of the vaccine cited and ask that the vaccine(s) listed below be given to me or to the person named above (for whom I am authorized to make this request). VACCINES ADMINISTERED:  Gardasil    I have read and hereby agree to be bound by the terms of this agreement as stated above. My signature is valid until revoked by me in writing. This document is signed by calvin, relationship: Self on 10/20/2023.:                                                                                                                                         Parent / Natalie Pratte Signature                                                Date    FRANCINE Sanchez served as a witness to authentication that the identity of the person signing electronically is in fact the person represented as signing. This document was generated by FRANCINE Sanchez on 10/20/2023.

## (undated) NOTE — ED AVS SNAPSHOT
Regulo Juarez   MRN: [id number]    Department:  [facility] Emergency Department   Date of Visit:  3/27/2018           Disclosure     Insurance plans vary and the physician(s) referred by the ER may not be covered by your plan.  Please contact tell this physician (or your personal doctor if your instructions are to return to your personal doctor) about any new or lasting problems. The primary care or specialist physician will see patients referred from the [facility] Emergency Department.  Dayanara Kwan

## (undated) NOTE — LETTER
Patient Name: Hannah Rodrigez  : 3/18/2000  MRN: [id number]  Patient Address: [place] Disease 2019 (COVID-19)     [facility] is committed to the safety and well-being of our patients, member carefully. If your symptoms get worse, call your healthcare provider immediately. 3. Get rest and stay hydrated.    4. If you have a medical appointment, call the healthcare provider ahead of time and tell them that you have or may have COVID-19.  5. For m of fever-reducing medications; and  · Improvement in respiratory symptoms (e.g., cough, shortness of breath); and  · At least 10 days have passed since symptoms first appeared OR if asymptomatic patient or date of symptom onset is unclear then use 10 days donors must:    · Have had a confirmed diagnosis of COVID-19  · Be symptom-free for at least 14 days*    *Some people will be required to have a repeat COVID-19 test in order to be eligible to donate.  If you’re instructed by Lali that a repeat test is r